# Patient Record
Sex: MALE | NOT HISPANIC OR LATINO | URBAN - METROPOLITAN AREA
[De-identification: names, ages, dates, MRNs, and addresses within clinical notes are randomized per-mention and may not be internally consistent; named-entity substitution may affect disease eponyms.]

---

## 2017-01-03 ENCOUNTER — IMPORTED ENCOUNTER (OUTPATIENT)
Dept: URBAN - METROPOLITAN AREA CLINIC 1 | Facility: CLINIC | Age: 77
End: 2017-01-03

## 2017-01-03 PROBLEM — H25.812: Noted: 2017-01-03

## 2017-01-03 PROCEDURE — 92136 OPHTHALMIC BIOMETRY: CPT

## 2017-01-03 NOTE — PATIENT DISCUSSION
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 The patient stated a full understanding and a desire to proceed with the procedure. The patient will need to start pre-operative eye drops as directed. Proceed w/ phaco PCL OSReturn for an appointment for sx OS as sceduled with Dr. Jelani Keller.

## 2017-01-11 ENCOUNTER — IMPORTED ENCOUNTER (OUTPATIENT)
Dept: URBAN - METROPOLITAN AREA CLINIC 1 | Facility: CLINIC | Age: 77
End: 2017-01-11

## 2017-01-12 ENCOUNTER — IMPORTED ENCOUNTER (OUTPATIENT)
Dept: URBAN - METROPOLITAN AREA CLINIC 1 | Facility: CLINIC | Age: 77
End: 2017-01-12

## 2017-01-12 PROBLEM — Z96.1: Noted: 2017-01-12

## 2017-01-12 PROCEDURE — 99024 POSTOP FOLLOW-UP VISIT: CPT

## 2017-01-12 NOTE — PATIENT DISCUSSION
1. POD#1 CE/IOL (Standard) OS doing well. Continue all 3 gtts as prescribed and until gone. Ocuflox TIDLotemax/Durezol/Prednisolone BIDProlensa/Ilevro/Acular QDPost op Warnings Reiterated 2. Return for an appointment as scheduled with Dr. Keysha Sanchez.

## 2017-02-03 ENCOUNTER — IMPORTED ENCOUNTER (OUTPATIENT)
Dept: URBAN - METROPOLITAN AREA CLINIC 1 | Facility: CLINIC | Age: 77
End: 2017-02-03

## 2017-02-03 PROBLEM — Z96.1: Noted: 2017-02-03

## 2017-02-03 PROCEDURE — 99024 POSTOP FOLLOW-UP VISIT: CPT

## 2017-02-03 NOTE — PATIENT DISCUSSION
1. POM#1 CE/IOL Standard OS doing well. Continue Lotemax/Durezol/Prednisolone BID until gone. Continue Prolensa/Ilevro/Acular QD until gone. 2. Return for an appointment for a /HVF in 6 months with Dr. Brigette Avina.

## 2017-08-07 ENCOUNTER — IMPORTED ENCOUNTER (OUTPATIENT)
Dept: URBAN - METROPOLITAN AREA CLINIC 1 | Facility: CLINIC | Age: 77
End: 2017-08-07

## 2017-08-07 PROBLEM — H04.123: Noted: 2017-08-07

## 2017-08-07 PROBLEM — Z79.84: Noted: 2017-08-07

## 2017-08-07 PROBLEM — E11.9: Noted: 2017-08-07

## 2017-08-07 PROBLEM — E11.3291: Noted: 2017-08-07

## 2017-08-07 PROBLEM — H16.143: Noted: 2017-08-07

## 2017-08-07 PROBLEM — H40.013: Noted: 2017-08-07

## 2017-08-07 PROCEDURE — 92083 EXTENDED VISUAL FIELD XM: CPT

## 2017-08-07 PROCEDURE — 92014 COMPRE OPH EXAM EST PT 1/>: CPT

## 2017-08-07 NOTE — PATIENT DISCUSSION
1.  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 Stressed the importance of strong glucose control. Advised of importance of at least yearly dilated examinations but to contact us immediately for any problems or concerns. 2.  DM Type II (Oral Meds) without sign of diabetic retinopathy and no blot heme on dilated retinal examination today OS No Macular Edema:  Discussed the pathophysiology of diabetes and its effect on the eye and risk of blindness. Stressed the importance of strong glucose control. Advised of importance of at least yearly dilated examinations but to contact us immediately for any problems or concerns. 3. JAKI w/ PEK OU-The use/continuation of artificial tears were recommended. 4.  Glaucoma Suspect OU : (CD 0.65 OU) Neg Fm Hx. VF shows no progression OU. Cont to observe on no meds. Patient is considered Low Risk. 5.  Pseudophakia OU (Mitrev OD PMG OS) H/o YAG Cap OD Letter to PCP Return for an appointment in 1 yr 27 OCT with Dr. Boswell Stuart.

## 2018-08-07 ENCOUNTER — IMPORTED ENCOUNTER (OUTPATIENT)
Dept: URBAN - METROPOLITAN AREA CLINIC 1 | Facility: CLINIC | Age: 78
End: 2018-08-07

## 2018-08-07 PROBLEM — Z79.84: Noted: 2018-08-07

## 2018-08-07 PROBLEM — Z96.1: Noted: 2018-08-07

## 2018-08-07 PROBLEM — E11.3391: Noted: 2018-08-07

## 2018-08-07 PROBLEM — H40.013: Noted: 2018-08-07

## 2018-08-07 PROBLEM — E11.3492: Noted: 2018-08-07

## 2018-08-07 PROBLEM — H16.143: Noted: 2018-08-07

## 2018-08-07 PROBLEM — H04.123: Noted: 2018-08-07

## 2018-08-07 PROCEDURE — 92014 COMPRE OPH EXAM EST PT 1/>: CPT

## 2018-08-07 PROCEDURE — 92133 CPTRZD OPH DX IMG PST SGM ON: CPT

## 2018-08-07 NOTE — PATIENT DISCUSSION
Glaucoma Suspect OU : (.75 OD/ .7 OS) OCT remains wnl OU. Patient is considered Low Risk. Condition was discussed with patient and patient understands. Will continue to monitor patient for any progression in condition. Patient was advised to call us with any problems questions or concerns.

## 2018-08-07 NOTE — PATIENT DISCUSSION
DM Type II with Severe Nonproliferative Diabetic Retinopathy OS No Macular Edema: (significant progression since last exam)  Discussed the pathophysiology of diabetes and its effect on the eye and risk of blindness. Stressed the importance of strong glucose control. Advised of importance of at least yearly dilated examinations but to contact us immediately for any problems or concerns.

## 2019-11-27 ENCOUNTER — IMPORTED ENCOUNTER (OUTPATIENT)
Dept: URBAN - METROPOLITAN AREA CLINIC 1 | Facility: CLINIC | Age: 79
End: 2019-11-27

## 2019-11-27 PROBLEM — Z79.84: Noted: 2019-11-27

## 2019-11-27 PROBLEM — H01.001: Noted: 2019-11-27

## 2019-11-27 PROBLEM — Z96.1: Noted: 2019-11-27

## 2019-11-27 PROBLEM — E11.3492: Noted: 2019-11-27

## 2019-11-27 PROBLEM — H26.492: Noted: 2019-11-27

## 2019-11-27 PROBLEM — H40.013: Noted: 2019-11-27

## 2019-11-27 PROBLEM — E11.3391: Noted: 2019-11-27

## 2019-11-27 PROBLEM — H04.123: Noted: 2019-11-27

## 2019-11-27 PROBLEM — H01.004: Noted: 2019-11-27

## 2019-11-27 PROBLEM — H16.143: Noted: 2019-11-27

## 2019-11-27 PROCEDURE — 92015 DETERMINE REFRACTIVE STATE: CPT

## 2019-11-27 PROCEDURE — 92014 COMPRE OPH EXAM EST PT 1/>: CPT

## 2019-11-27 PROCEDURE — 92250 FUNDUS PHOTOGRAPHY W/I&R: CPT

## 2019-11-27 NOTE — PATIENT DISCUSSION
1.  DM Type II (Oral Medication) with Moderate Nonproliferative Diabetic Retinopathy OD No Macular Edema:  Discussed the pathophysiology of diabetes and its effect on the eye and risk of blindness. Stressed the importance of strong glucose control. Advised of importance of at least yearly dilated examinations but to contact us immediately for any problems or concerns. 2.  DM Type II (Oral Medication) with Severe Nonproliferative Diabetic Retinopathy OS No Macular Edema:  Discussed the pathophysiology of diabetes and its effect on the eye and risk of blindness. Stressed the importance of strong glucose control. Advised of importance of at least yearly dilated examinations but to contact us immediately for any problems or concerns. 3. JAKI w/ PEK OU- Recommend ATs TID OU routinely 4. PCO OS- Visually Significant *secondary to glare*; schedule YAG Cap OS. Pros cons risks and benefits. 5.  Pseudophakia OU - (Mitrev OD/ PMG OS)  H/o YAG Cap OD 6. Anterior Blepharitis OU - Daily Hot compresses and lid scrubs were recommended. 7. Glaucoma Suspect OU (CD 0.75/0.70): Pahst w/u negative. Patient is considered Low Risk. Condition was discussed with patient and patient understands. Will continue to monitor patient for any progression in condition. Patient was advised to call us with any problems questions or concerns. Return for an appointment in YAG Cap OS with Dr. Keysha Sanchez.

## 2019-11-27 NOTE — PATIENT DISCUSSION
DM Type II with Severe Nonproliferative Diabetic Retinopathy OS No Macular Edema:  Discussed the pathophysiology of diabetes and its effect on the eye and risk of blindness. Stressed the importance of strong glucose control. Advised of importance of at least yearly dilated examinations but to contact us immediately for any problems or concerns.

## 2019-12-20 ENCOUNTER — IMPORTED ENCOUNTER (OUTPATIENT)
Dept: URBAN - METROPOLITAN AREA CLINIC 1 | Facility: CLINIC | Age: 79
End: 2019-12-20

## 2019-12-20 PROBLEM — H26.492: Noted: 2019-12-20

## 2019-12-20 PROCEDURE — 66821 AFTER CATARACT LASER SURGERY: CPT

## 2019-12-22 NOTE — PATIENT DISCUSSION
1.  DM Type II with Moderate Nonproliferative Diabetic Retinopathy OD (progression since last exam) No Macular Edema:  Discussed the pathophysiology of diabetes and its effect on the eye and risk of blindness. Stressed the importance of strong glucose control. Advised of importance of at least yearly dilated examinations but to contact us immediately for any problems or concerns. 2.  DM Type II with Severe Nonproliferative Diabetic Retinopathy OS No Macular Edema: (significant progression since last exam)  Discussed the pathophysiology of diabetes and its effect on the eye and risk of blindness. Stressed the importance of strong glucose control. Advised of importance of at least yearly dilated examinations but to contact us immediately for any problems or concerns. 3. Type II diabetes controlled by oral medications. 4.  Glaucoma Suspect OU : (.75 OD/ .7 OS) OCT remains wnl OU. Patient is considered Low Risk. Condition was discussed with patient and patient understands. Will continue to monitor patient for any progression in condition. Patient was advised to call us with any problems questions or concerns. 5.  JAKI w/ PEK OU-The continuation of artificial tears were recommended. 6.  Pseudophakia OU - (Mitrev OD/ PMG OS)7. Return for an appointment for 3mo/DFE and Mac photos with Dr. Jim Howard. negative Alert & oriented; no sensory, motor or coordination deficits, normal reflexes

## 2020-02-10 ENCOUNTER — IMPORTED ENCOUNTER (OUTPATIENT)
Dept: URBAN - METROPOLITAN AREA CLINIC 1 | Facility: CLINIC | Age: 80
End: 2020-02-10

## 2020-02-10 PROCEDURE — 99024 POSTOP FOLLOW-UP VISIT: CPT

## 2020-02-10 NOTE — PATIENT DISCUSSION
PO YAG Cap OU: good result. MRX given. Return for an appointment in 3 months for a 10/dfe with Dr. Thompson Martinez.

## 2022-04-02 ASSESSMENT — VISUAL ACUITY
OD_CC: 20/25-2
OS_CC: 20/25
OS_CC: 20/40+2
OS_CC: 20/25-1
OS_CC: 20/25
OD_CC: 20/25-1
OS_CC: 20/25-2
OS_CC: 20/30
OD_CC: 20/25
OD_CC: 20/25
OS_GLARE: 20/60
OD_CC: 20/25-1

## 2022-04-02 ASSESSMENT — TONOMETRY
OS_IOP_MMHG: 16
OS_IOP_MMHG: 15
OD_IOP_MMHG: 15
OS_IOP_MMHG: 13
OD_IOP_MMHG: 15
OS_IOP_MMHG: 21
OD_IOP_MMHG: 16
OS_IOP_MMHG: 15
OS_IOP_MMHG: 18
OD_IOP_MMHG: 13